# Patient Record
Sex: MALE | Race: WHITE | NOT HISPANIC OR LATINO | URBAN - METROPOLITAN AREA
[De-identification: names, ages, dates, MRNs, and addresses within clinical notes are randomized per-mention and may not be internally consistent; named-entity substitution may affect disease eponyms.]

---

## 2017-01-24 ENCOUNTER — EMERGENCY (EMERGENCY)
Facility: HOSPITAL | Age: 27
LOS: 1 days | Discharge: HOME | End: 2017-01-24
Attending: EMERGENCY MEDICINE | Admitting: EMERGENCY MEDICINE
Payer: COMMERCIAL

## 2017-01-24 VITALS
OXYGEN SATURATION: 96 % | TEMPERATURE: 101 F | SYSTOLIC BLOOD PRESSURE: 128 MMHG | RESPIRATION RATE: 21 BRPM | DIASTOLIC BLOOD PRESSURE: 80 MMHG | HEART RATE: 105 BPM | WEIGHT: 164.91 LBS

## 2017-01-24 VITALS
TEMPERATURE: 100 F | RESPIRATION RATE: 18 BRPM | DIASTOLIC BLOOD PRESSURE: 58 MMHG | HEART RATE: 95 BPM | SYSTOLIC BLOOD PRESSURE: 122 MMHG | OXYGEN SATURATION: 96 %

## 2017-01-24 DIAGNOSIS — R05 COUGH: ICD-10-CM

## 2017-01-24 DIAGNOSIS — J09.X2 INFLUENZA DUE TO IDENTIFIED NOVEL INFLUENZA A VIRUS WITH OTHER RESPIRATORY MANIFESTATIONS: ICD-10-CM

## 2017-01-24 DIAGNOSIS — R19.7 DIARRHEA, UNSPECIFIED: ICD-10-CM

## 2017-01-24 DIAGNOSIS — R00.0 TACHYCARDIA, UNSPECIFIED: ICD-10-CM

## 2017-01-24 LAB
ALBUMIN SERPL ELPH-MCNC: 4.5 G/DL — SIGNIFICANT CHANGE UP (ref 3.4–5)
ALP SERPL-CCNC: 68 U/L — SIGNIFICANT CHANGE UP (ref 40–120)
ALT FLD-CCNC: 52 U/L — HIGH (ref 12–42)
ANION GAP SERPL CALC-SCNC: 10 MMOL/L — SIGNIFICANT CHANGE UP (ref 9–16)
APTT BLD: 34.4 SEC — SIGNIFICANT CHANGE UP (ref 27.5–36.5)
AST SERPL-CCNC: 30 U/L — SIGNIFICANT CHANGE UP (ref 15–37)
BASOPHILS NFR BLD AUTO: 0.4 % — SIGNIFICANT CHANGE UP (ref 0–2)
BILIRUB SERPL-MCNC: 0.5 MG/DL — SIGNIFICANT CHANGE UP (ref 0.2–1.2)
BUN SERPL-MCNC: 15 MG/DL — SIGNIFICANT CHANGE UP (ref 7–23)
CALCIUM SERPL-MCNC: 9.6 MG/DL — SIGNIFICANT CHANGE UP (ref 8.5–10.5)
CHLORIDE SERPL-SCNC: 102 MMOL/L — SIGNIFICANT CHANGE UP (ref 96–108)
CO2 SERPL-SCNC: 26 MMOL/L — SIGNIFICANT CHANGE UP (ref 22–31)
CREAT SERPL-MCNC: 1.03 MG/DL — SIGNIFICANT CHANGE UP (ref 0.5–1.3)
EOSINOPHIL NFR BLD AUTO: 0.6 % — SIGNIFICANT CHANGE UP (ref 0–6)
ETHANOL SERPL-MCNC: <3 MG/DL — SIGNIFICANT CHANGE UP
FLUAV SPEC QL CULT: POSITIVE
FLUBV AG SPEC QL IA: NEGATIVE — SIGNIFICANT CHANGE UP
GLUCOSE SERPL-MCNC: 112 MG/DL — HIGH (ref 70–99)
HCT VFR BLD CALC: 38.9 % — LOW (ref 39–50)
HGB BLD-MCNC: 13.6 G/DL — SIGNIFICANT CHANGE UP (ref 13–17)
IMM GRANULOCYTES NFR BLD AUTO: 0.4 % — SIGNIFICANT CHANGE UP (ref 0–1.5)
INR BLD: 1.15 — SIGNIFICANT CHANGE UP (ref 0.88–1.16)
LACTATE SERPL-SCNC: 0.9 MMOL/L — SIGNIFICANT CHANGE UP (ref 0.4–2)
LIDOCAIN IGE QN: 86 U/L — SIGNIFICANT CHANGE UP (ref 73–393)
LYMPHOCYTES # BLD AUTO: 12.4 % — LOW (ref 13–44)
MCHC RBC-ENTMCNC: 29.8 PG — SIGNIFICANT CHANGE UP (ref 27–34)
MCHC RBC-ENTMCNC: 35 G/DL — SIGNIFICANT CHANGE UP (ref 32–36)
MCV RBC AUTO: 85.3 FL — SIGNIFICANT CHANGE UP (ref 80–100)
MONOCYTES NFR BLD AUTO: 13.2 % — SIGNIFICANT CHANGE UP (ref 2–14)
NEUTROPHILS NFR BLD AUTO: 73 % — SIGNIFICANT CHANGE UP (ref 43–77)
PLATELET # BLD AUTO: 197 K/UL — SIGNIFICANT CHANGE UP (ref 150–400)
POTASSIUM SERPL-MCNC: 3.6 MMOL/L — SIGNIFICANT CHANGE UP (ref 3.5–5.3)
POTASSIUM SERPL-SCNC: 3.6 MMOL/L — SIGNIFICANT CHANGE UP (ref 3.5–5.3)
PROT SERPL-MCNC: 8 G/DL — SIGNIFICANT CHANGE UP (ref 6.4–8.2)
PROTHROM AB SERPL-ACNC: 12.7 SEC — SIGNIFICANT CHANGE UP (ref 10–13.1)
RBC # BLD: 4.56 M/UL — SIGNIFICANT CHANGE UP (ref 4.2–5.8)
RBC # FLD: 12.4 % — SIGNIFICANT CHANGE UP (ref 10.3–16.9)
S PYO AG SPEC QL IA: NEGATIVE — SIGNIFICANT CHANGE UP
SODIUM SERPL-SCNC: 138 MMOL/L — SIGNIFICANT CHANGE UP (ref 132–145)
TSH SERPL-MCNC: 0.72 UIU/ML — SIGNIFICANT CHANGE UP (ref 0.36–3.74)
WBC # BLD: 4.8 K/UL — SIGNIFICANT CHANGE UP (ref 3.8–10.5)
WBC # FLD AUTO: 4.8 K/UL — SIGNIFICANT CHANGE UP (ref 3.8–10.5)

## 2017-01-24 PROCEDURE — 71010: CPT | Mod: 26

## 2017-01-24 PROCEDURE — 74176 CT ABD & PELVIS W/O CONTRAST: CPT | Mod: 26

## 2017-01-24 PROCEDURE — 99285 EMERGENCY DEPT VISIT HI MDM: CPT

## 2017-01-24 RX ORDER — ONDANSETRON 8 MG/1
4 TABLET, FILM COATED ORAL ONCE
Qty: 0 | Refills: 0 | Status: COMPLETED | OUTPATIENT
Start: 2017-01-24 | End: 2017-01-24

## 2017-01-24 RX ORDER — SODIUM CHLORIDE 9 MG/ML
500 INJECTION INTRAMUSCULAR; INTRAVENOUS; SUBCUTANEOUS
Qty: 0 | Refills: 0 | Status: COMPLETED | OUTPATIENT
Start: 2017-01-24 | End: 2017-01-24

## 2017-01-24 RX ORDER — ACETAMINOPHEN 500 MG
975 TABLET ORAL ONCE
Qty: 0 | Refills: 0 | Status: COMPLETED | OUTPATIENT
Start: 2017-01-24 | End: 2017-01-24

## 2017-01-24 RX ORDER — IBUPROFEN 200 MG
800 TABLET ORAL ONCE
Qty: 0 | Refills: 0 | Status: COMPLETED | OUTPATIENT
Start: 2017-01-24 | End: 2017-01-24

## 2017-01-24 RX ADMIN — SODIUM CHLORIDE 2000 MILLILITER(S): 9 INJECTION INTRAMUSCULAR; INTRAVENOUS; SUBCUTANEOUS at 21:31

## 2017-01-24 RX ADMIN — Medication 800 MILLIGRAM(S): at 20:50

## 2017-01-24 RX ADMIN — SODIUM CHLORIDE 2000 MILLILITER(S): 9 INJECTION INTRAMUSCULAR; INTRAVENOUS; SUBCUTANEOUS at 21:13

## 2017-01-24 RX ADMIN — SODIUM CHLORIDE 2000 MILLILITER(S): 9 INJECTION INTRAMUSCULAR; INTRAVENOUS; SUBCUTANEOUS at 20:45

## 2017-01-24 RX ADMIN — Medication 10 MILLILITER(S): at 21:55

## 2017-01-24 RX ADMIN — Medication 75 MILLIGRAM(S): at 22:17

## 2017-01-24 RX ADMIN — ONDANSETRON 4 MILLIGRAM(S): 8 TABLET, FILM COATED ORAL at 20:56

## 2017-01-24 RX ADMIN — SODIUM CHLORIDE 2000 MILLILITER(S): 9 INJECTION INTRAMUSCULAR; INTRAVENOUS; SUBCUTANEOUS at 21:00

## 2017-01-24 RX ADMIN — SODIUM CHLORIDE 2000 MILLILITER(S): 9 INJECTION INTRAMUSCULAR; INTRAVENOUS; SUBCUTANEOUS at 21:55

## 2017-01-24 RX ADMIN — Medication 975 MILLIGRAM(S): at 20:50

## 2017-01-24 NOTE — ED ADULT TRIAGE NOTE - CHIEF COMPLAINT QUOTE
here for fever, abdominal pain, n/v/d- here for fever, abdominal pain, n/v/d- appears lethargic- here for fever, cough, abdominal pain, n/v/d- appears lethargic-

## 2017-01-24 NOTE — ED PROVIDER NOTE - NEUROLOGICAL, MLM
Alert and oriented, no focal deficits, no motor or sensory deficits. neck supple, no Kernig's/Brudzinski's.

## 2017-01-24 NOTE — ED PROVIDER NOTE - OBJECTIVE STATEMENT
27 yo male no pmhx to ED via walk in accompanied by mother and girlfriend c/o gradual onset feeling ill today around noon while at work. Pt states muscle aches, fever/chills and np cough followed by sudden onset n/v/d. Pt c/o weakness. Denies headache, stiff neck, rash. Denies international travel within past 30 days.

## 2017-01-24 NOTE — ED ADULT NURSE NOTE - OBJECTIVE STATEMENT
Pt. c/o abdominal pain, nausea, vomiting, fever and weakness since this morning. No significant PMH, no recent travel.

## 2017-01-30 LAB
CULTURE RESULTS: SIGNIFICANT CHANGE UP
CULTURE RESULTS: SIGNIFICANT CHANGE UP
SPECIMEN SOURCE: SIGNIFICANT CHANGE UP
SPECIMEN SOURCE: SIGNIFICANT CHANGE UP

## 2018-10-13 ENCOUNTER — EMERGENCY (EMERGENCY)
Facility: HOSPITAL | Age: 28
LOS: 1 days | Discharge: HOME | End: 2018-10-13
Attending: EMERGENCY MEDICINE | Admitting: EMERGENCY MEDICINE
Payer: COMMERCIAL

## 2018-10-13 VITALS
SYSTOLIC BLOOD PRESSURE: 139 MMHG | DIASTOLIC BLOOD PRESSURE: 75 MMHG | HEART RATE: 79 BPM | RESPIRATION RATE: 14 BRPM | OXYGEN SATURATION: 99 %

## 2018-10-13 VITALS
RESPIRATION RATE: 16 BRPM | DIASTOLIC BLOOD PRESSURE: 81 MMHG | OXYGEN SATURATION: 98 % | HEART RATE: 89 BPM | WEIGHT: 149.91 LBS | TEMPERATURE: 97 F | SYSTOLIC BLOOD PRESSURE: 145 MMHG

## 2018-10-13 LAB
ALBUMIN SERPL ELPH-MCNC: 4.9 G/DL — SIGNIFICANT CHANGE UP (ref 3.4–5)
ALP SERPL-CCNC: 64 U/L — SIGNIFICANT CHANGE UP (ref 40–120)
ALT FLD-CCNC: 27 U/L — SIGNIFICANT CHANGE UP (ref 12–42)
ANION GAP SERPL CALC-SCNC: 14 MMOL/L — SIGNIFICANT CHANGE UP (ref 9–16)
APTT BLD: 28 SEC — SIGNIFICANT CHANGE UP (ref 27.5–36.5)
AST SERPL-CCNC: 24 U/L — SIGNIFICANT CHANGE UP (ref 15–37)
BASOPHILS NFR BLD AUTO: 0.6 % — SIGNIFICANT CHANGE UP (ref 0–2)
BILIRUB SERPL-MCNC: 0.3 MG/DL — SIGNIFICANT CHANGE UP (ref 0.2–1.2)
BUN SERPL-MCNC: 19 MG/DL — SIGNIFICANT CHANGE UP (ref 7–23)
CALCIUM SERPL-MCNC: 8.8 MG/DL — SIGNIFICANT CHANGE UP (ref 8.5–10.5)
CHLORIDE SERPL-SCNC: 104 MMOL/L — SIGNIFICANT CHANGE UP (ref 96–108)
CO2 SERPL-SCNC: 22 MMOL/L — SIGNIFICANT CHANGE UP (ref 22–31)
CREAT SERPL-MCNC: 1.07 MG/DL — SIGNIFICANT CHANGE UP (ref 0.5–1.3)
EOSINOPHIL NFR BLD AUTO: 0.9 % — SIGNIFICANT CHANGE UP (ref 0–6)
ETHANOL SERPL-MCNC: 46 MG/DL — HIGH
GLUCOSE BLDC GLUCOMTR-MCNC: 105 MG/DL — HIGH (ref 70–99)
GLUCOSE SERPL-MCNC: 119 MG/DL — HIGH (ref 70–99)
HCT VFR BLD CALC: 40 % — SIGNIFICANT CHANGE UP (ref 39–50)
HGB BLD-MCNC: 13.8 G/DL — SIGNIFICANT CHANGE UP (ref 13–17)
IMM GRANULOCYTES NFR BLD AUTO: 0.3 % — SIGNIFICANT CHANGE UP (ref 0–1.5)
INR BLD: 1.07 — SIGNIFICANT CHANGE UP (ref 0.88–1.16)
LYMPHOCYTES # BLD AUTO: 40.2 % — SIGNIFICANT CHANGE UP (ref 13–44)
MAGNESIUM SERPL-MCNC: 1.8 MG/DL — SIGNIFICANT CHANGE UP (ref 1.6–2.6)
MCHC RBC-ENTMCNC: 30.1 PG — SIGNIFICANT CHANGE UP (ref 27–34)
MCHC RBC-ENTMCNC: 34.5 G/DL — SIGNIFICANT CHANGE UP (ref 32–36)
MCV RBC AUTO: 87.3 FL — SIGNIFICANT CHANGE UP (ref 80–100)
MONOCYTES NFR BLD AUTO: 6.3 % — SIGNIFICANT CHANGE UP (ref 2–14)
NEUTROPHILS NFR BLD AUTO: 51.7 % — SIGNIFICANT CHANGE UP (ref 43–77)
PLATELET # BLD AUTO: 262 K/UL — SIGNIFICANT CHANGE UP (ref 150–400)
POTASSIUM SERPL-MCNC: 2.9 MMOL/L — CRITICAL LOW (ref 3.5–5.3)
POTASSIUM SERPL-MCNC: 3.4 MMOL/L — LOW (ref 3.5–5.3)
POTASSIUM SERPL-SCNC: 2.9 MMOL/L — CRITICAL LOW (ref 3.5–5.3)
POTASSIUM SERPL-SCNC: 3.4 MMOL/L — LOW (ref 3.5–5.3)
PROT SERPL-MCNC: 8.1 G/DL — SIGNIFICANT CHANGE UP (ref 6.4–8.2)
PROTHROM AB SERPL-ACNC: 11.8 SEC — SIGNIFICANT CHANGE UP (ref 9.8–12.7)
RBC # BLD: 4.58 M/UL — SIGNIFICANT CHANGE UP (ref 4.2–5.8)
RBC # FLD: 12.2 % — SIGNIFICANT CHANGE UP (ref 10.3–14.5)
SODIUM SERPL-SCNC: 140 MMOL/L — SIGNIFICANT CHANGE UP (ref 132–145)
WBC # BLD: 12.1 K/UL — HIGH (ref 3.8–10.5)
WBC # FLD AUTO: 12.1 K/UL — HIGH (ref 3.8–10.5)

## 2018-10-13 PROCEDURE — 93010 ELECTROCARDIOGRAM REPORT: CPT

## 2018-10-13 PROCEDURE — 99284 EMERGENCY DEPT VISIT MOD MDM: CPT | Mod: 25

## 2018-10-13 PROCEDURE — 70450 CT HEAD/BRAIN W/O DYE: CPT | Mod: 26

## 2018-10-13 RX ORDER — MAGNESIUM SULFATE 500 MG/ML
1 VIAL (ML) INJECTION ONCE
Qty: 0 | Refills: 0 | Status: DISCONTINUED | OUTPATIENT
Start: 2018-10-13 | End: 2018-10-13

## 2018-10-13 RX ORDER — ONDANSETRON 8 MG/1
4 TABLET, FILM COATED ORAL ONCE
Qty: 0 | Refills: 0 | Status: COMPLETED | OUTPATIENT
Start: 2018-10-13 | End: 2018-10-13

## 2018-10-13 RX ORDER — SODIUM CHLORIDE 9 MG/ML
1000 INJECTION INTRAMUSCULAR; INTRAVENOUS; SUBCUTANEOUS ONCE
Qty: 0 | Refills: 0 | Status: COMPLETED | OUTPATIENT
Start: 2018-10-13 | End: 2018-10-13

## 2018-10-13 RX ORDER — POTASSIUM CHLORIDE 20 MEQ
40 PACKET (EA) ORAL ONCE
Qty: 0 | Refills: 0 | Status: COMPLETED | OUTPATIENT
Start: 2018-10-13 | End: 2018-10-13

## 2018-10-13 RX ADMIN — SODIUM CHLORIDE 1000 MILLILITER(S): 9 INJECTION INTRAMUSCULAR; INTRAVENOUS; SUBCUTANEOUS at 21:24

## 2018-10-13 RX ADMIN — Medication 40 MILLIEQUIVALENT(S): at 22:28

## 2018-10-13 RX ADMIN — ONDANSETRON 4 MILLIGRAM(S): 8 TABLET, FILM COATED ORAL at 21:24

## 2018-10-13 NOTE — ED ADULT TRIAGE NOTE - CHIEF COMPLAINT QUOTE
Pt brought in by EMS for syncopal episode from a standing position while waiting in line at [employer]. No reported injuries at this time.  Pt states he feels "not good" and has nausea. Vomitted once while in the ambulance. Admits to smoking marijuana today.

## 2018-10-13 NOTE — ED ADULT NURSE NOTE - CHPI ED NUR SYMPTOMS NEG
no congestion/no shortness of breath/no chest pain/no vomiting/no chills/no back pain/no diaphoresis/no dizziness/no fever

## 2018-10-13 NOTE — ED ADULT NURSE NOTE - NSIMPLEMENTINTERV_GEN_ALL_ED
Implemented All Fall Risk Interventions:  [place] to call system. Call bell, personal items and telephone within reach. Instruct patient to call for assistance. Room bathroom lighting operational. Non-slip footwear when patient is off stretcher. Physically safe environment: no spills, clutter or unnecessary equipment. Stretcher in lowest position, wheels locked, appropriate side rails in place. Provide visual cue, wrist band, yellow gown, etc. Monitor gait and stability. Monitor for mental status changes and reorient to person, place, and time. Review medications for side effects contributing to fall risk. Reinforce activity limits and safety measures with patient and family.

## 2018-10-13 NOTE — ED ADULT NURSE NOTE - CHIEF COMPLAINT QUOTE
Pt brought in by EMS for syncopal episode from a standing position while waiting in line at [employer]. No reported injuries at this time.  Pt states he feels "not good" and has nausea. Vomitted once while in the ambulance. Admits to smoking marijuana today.

## 2018-10-13 NOTE — ED PROVIDER NOTE - MEDICAL DECISION MAKING DETAILS
Strict prompt return precautions to the Emergency Room discussed for any worsening or new symptoms. The patient verbalized understanding of these precautions and need to return. The patient tolerated PO fluids.  The patient's symptoms progressively improved throughout the ED stay. At the time of discharge from the Emergency Department, the patient is alert with fluent appropriate speech and ambulatory without difficulty.  A medical screening examination was performed and no emergency medical condition was identified.

## 2018-10-13 NOTE — ED PROVIDER NOTE - OBJECTIVE STATEMENT
27 yo male w episode of syncope while standing tonight s/p alcohol and marijuana use. pt states previous history of similar syncope episode in past. no prior history of seizure. no seizure activity w episode tonight. possible head trauma withy fall from standing height w syncope this episode. pt states he was feeling well on line at restaurant when he had a feeling of general warmth light-headedness "feeling like I was going to pass out" and then awoke on ground.   girlfriend present on scene and in ED, state episode laste x  30 seconds and when pt awake he was aware of who he was and where he was.     The patient denies the following symptoms:  headache, dizziness/lightheadedness, neck pain/neck stiffness, focal weakness, rash, fever, chills, chest/neck/jaw/arm or back pain, palpitations, shortness of breath, diaphoresis, swelling to arm and/or legs, N/V/D, abdominal pain,

## 2018-10-17 DIAGNOSIS — R55 SYNCOPE AND COLLAPSE: ICD-10-CM

## 2018-10-17 DIAGNOSIS — Z79.899 OTHER LONG TERM (CURRENT) DRUG THERAPY: ICD-10-CM

## 2020-11-05 NOTE — ED ADULT NURSE NOTE - MODE OF DISCHARGE
Ambulatory Opioid Withdrawal    WHAT YOU NEED TO KNOW:    Withdrawal is a response to a sudden lack of opioids in your body. Withdrawal happens when you suddenly decrease or stop taking an opioid you are dependent on. Dependence means you feel you need the opioid to function mentally or physically. This happens after you have used the opioid regularly for a long time. Withdrawal can happen with an illegal opioid such as heroin, or a prescription opioid such as oxycodone or fentanyl.    DISCHARGE INSTRUCTIONS:    Call your local emergency number (911 in the ), or have someone else call if:   •You have a seizure.      •You cannot be woken.      Call your doctor if:   •You have a fast heartbeat.      •You have nausea and are vomiting, or you cannot stop vomiting.      •You have the following signs and symptoms of dehydration: ?Dry eyes or mouth      ?Increased thirst      ?Dark yellow urine, or urinating little or not at all      ?Headache, dizziness, or confusion      ?Irregular or fast breathing, fast or pounding heartbeat      •You have questions or concerns about your condition or care.      Medicines: You may need any of the following:   •NSAIDs, such as ibuprofen, help decrease swelling, pain, and fever. This medicine is available with or without a doctor's order. NSAIDs can cause stomach bleeding or kidney problems in certain people. If you take blood thinner medicine, always ask your healthcare provider if NSAIDs are safe for you. Always read the medicine label and follow directions.      •Blood pressure medicine decreases symptoms of withdrawal, such as nausea, vomiting, muscle tension, and anxiety.       •Antianxiety medicine decreases anxiety and helps you feel calm and relaxed.      •Antinausea medicine helps calm your stomach and prevent vomiting.       •Take your medicine as directed. Contact your healthcare provider if you think your medicine is not helping or if you have side effects. Tell him of her if you are allergic to any medicine. Keep a list of the medicines, vitamins, and herbs you take. Include the amounts, and when and why you take them. Bring the list or the pill bottles to follow-up visits. Carry your medicine list with you in case of an emergency.      Prevent withdrawal from a prescription opioid: The best way to prevent withdrawal is to prevent tolerance. You may need to take a different kind of pain medicine after a surgery or injury. You can also talk to your healthcare provider about ways to manage pain without medicine. If you do need to take an opioid medicine, the following can help prevent withdrawal:   •Do not suddenly stop using the opioid. If you have been using the opioid for longer than 2 weeks, a sudden stop may cause dangerous side effects. Work with your healthcare provider to decrease your dose slowly.      •Take a prescribed opioid exactly as directed. Do not take more than the recommended amount. Do not take it more often or for longer than recommended. If you use a pain patch, be sure to remove the old patch before you place a new one. Talk to your doctor or a pharmacist if you have any questions about your medicine. Opioids often come with a Medication Guide to help you use it safely. Ask your pharmacists for a copy if you do not get one when you fill the prescription.      •Talk to your provider about signs or symptoms of a problem. Tell your provider if you think you are developing opioid tolerance or dependence. Work with your provider to stop or lower the amount safely.      Opioid safety:   •Do not take opioids that belong to someone else. The kind or amount that person takes may not be right for you.      •Do not mix opioids with other medicines or alcohol. The combination can cause an overdose, or cause you to stop breathing. Alcohol, sleeping pills, and medicines such as antihistamines can make you sleepy. A combination with opioids can lead to a coma.      •Learn about the signs of an overdose so you know how to respond. Tell others about these signs so they will know what to do if needed. Talk to your healthcare provider about naloxone. You may be able to keep naloxone at home in case of an overdose. Your family and friends can also be trained on how to give it to you if needed.      •Keep opioids out of the reach of children. Store opioids in a locked cabinet or in a location that children cannot get to.      •Follow instructions for what to do with prescription opioids you do not use. Your healthcare provider will give you instructions for how to dispose of it safely. This helps make sure no one else takes it.      Follow up with your healthcare provider as directed: You may need to return for other tests. You may also be referred to a specialty clinic to receive maintenance therapy medicine on a regular basis. Write down your questions so you remember to ask them during your visits.

## 2023-02-28 NOTE — ED PROVIDER NOTE - CARDIAC RHYTHM
- required transfusion on 2/22 and 2/27  - followup CT A/P to evaluate for bleed  - holding home Eliquis for now. Resume if no signs of bleeding  - followup stool occult  - CBC daily      regular

## 2023-08-01 ENCOUNTER — EMERGENCY (EMERGENCY)
Facility: HOSPITAL | Age: 33
LOS: 1 days | Discharge: HOME | End: 2023-08-01
Attending: EMERGENCY MEDICINE | Admitting: EMERGENCY MEDICINE
Payer: COMMERCIAL

## 2023-08-01 VITALS
OXYGEN SATURATION: 95 % | TEMPERATURE: 99 F | HEART RATE: 77 BPM | DIASTOLIC BLOOD PRESSURE: 71 MMHG | RESPIRATION RATE: 16 BRPM | SYSTOLIC BLOOD PRESSURE: 129 MMHG

## 2023-08-01 VITALS
RESPIRATION RATE: 16 BRPM | HEIGHT: 72 IN | OXYGEN SATURATION: 98 % | TEMPERATURE: 99 F | DIASTOLIC BLOOD PRESSURE: 72 MMHG | WEIGHT: 164.91 LBS | HEART RATE: 83 BPM | SYSTOLIC BLOOD PRESSURE: 115 MMHG

## 2023-08-01 LAB
ALBUMIN SERPL ELPH-MCNC: 4.1 G/DL — SIGNIFICANT CHANGE UP (ref 3.4–5)
ALP SERPL-CCNC: 129 U/L — HIGH (ref 40–120)
ALT FLD-CCNC: 203 U/L — HIGH (ref 12–42)
ANION GAP SERPL CALC-SCNC: 8 MMOL/L — LOW (ref 9–16)
AST SERPL-CCNC: 99 U/L — HIGH (ref 15–37)
BASOPHILS # BLD AUTO: 0.09 K/UL — SIGNIFICANT CHANGE UP (ref 0–0.2)
BASOPHILS NFR BLD AUTO: 1 % — SIGNIFICANT CHANGE UP (ref 0–2)
BILIRUB SERPL-MCNC: 0.5 MG/DL — SIGNIFICANT CHANGE UP (ref 0.2–1.2)
BUN SERPL-MCNC: 17 MG/DL — SIGNIFICANT CHANGE UP (ref 7–23)
CALCIUM SERPL-MCNC: 9.4 MG/DL — SIGNIFICANT CHANGE UP (ref 8.5–10.5)
CHLORIDE SERPL-SCNC: 101 MMOL/L — SIGNIFICANT CHANGE UP (ref 96–108)
CO2 SERPL-SCNC: 27 MMOL/L — SIGNIFICANT CHANGE UP (ref 22–31)
CREAT SERPL-MCNC: 0.99 MG/DL — SIGNIFICANT CHANGE UP (ref 0.5–1.3)
EGFR: 103 ML/MIN/1.73M2 — SIGNIFICANT CHANGE UP
EOSINOPHIL # BLD AUTO: 0 K/UL — SIGNIFICANT CHANGE UP (ref 0–0.5)
EOSINOPHIL NFR BLD AUTO: 0 % — SIGNIFICANT CHANGE UP (ref 0–6)
FLUAV AG NPH QL: SIGNIFICANT CHANGE UP
FLUBV AG NPH QL: SIGNIFICANT CHANGE UP
GLUCOSE SERPL-MCNC: 97 MG/DL — SIGNIFICANT CHANGE UP (ref 70–99)
HCT VFR BLD CALC: 35.3 % — LOW (ref 39–50)
HGB BLD-MCNC: 11.6 G/DL — LOW (ref 13–17)
LYMPHOCYTES # BLD AUTO: 4.83 K/UL — HIGH (ref 1–3.3)
LYMPHOCYTES # BLD AUTO: 53 % — HIGH (ref 13–44)
MCHC RBC-ENTMCNC: 29.8 PG — SIGNIFICANT CHANGE UP (ref 27–34)
MCHC RBC-ENTMCNC: 32.9 GM/DL — SIGNIFICANT CHANGE UP (ref 32–36)
MCV RBC AUTO: 90.7 FL — SIGNIFICANT CHANGE UP (ref 80–100)
MONOCYTES # BLD AUTO: 0.46 K/UL — SIGNIFICANT CHANGE UP (ref 0–0.9)
MONOCYTES NFR BLD AUTO: 5 % — SIGNIFICANT CHANGE UP (ref 2–14)
NEUTROPHILS # BLD AUTO: 2.82 K/UL — SIGNIFICANT CHANGE UP (ref 1.8–7.4)
NEUTROPHILS NFR BLD AUTO: 24 % — LOW (ref 43–77)
NRBC # BLD: SIGNIFICANT CHANGE UP /100 WBCS (ref 0–0)
PLATELET # BLD AUTO: 278 K/UL — SIGNIFICANT CHANGE UP (ref 150–400)
POTASSIUM SERPL-MCNC: 4.1 MMOL/L — SIGNIFICANT CHANGE UP (ref 3.5–5.3)
POTASSIUM SERPL-SCNC: 4.1 MMOL/L — SIGNIFICANT CHANGE UP (ref 3.5–5.3)
PROT SERPL-MCNC: 7.9 G/DL — SIGNIFICANT CHANGE UP (ref 6.4–8.2)
RBC # BLD: 3.89 M/UL — LOW (ref 4.2–5.8)
RBC # FLD: 13.1 % — SIGNIFICANT CHANGE UP (ref 10.3–14.5)
RSV RNA NPH QL NAA+NON-PROBE: SIGNIFICANT CHANGE UP
SARS-COV-2 RNA SPEC QL NAA+PROBE: SIGNIFICANT CHANGE UP
SODIUM SERPL-SCNC: 136 MMOL/L — SIGNIFICANT CHANGE UP (ref 132–145)
WBC # BLD: 9.11 K/UL — SIGNIFICANT CHANGE UP (ref 3.8–10.5)
WBC # FLD AUTO: 9.11 K/UL — SIGNIFICANT CHANGE UP (ref 3.8–10.5)

## 2023-08-01 PROCEDURE — 71046 X-RAY EXAM CHEST 2 VIEWS: CPT | Mod: 26

## 2023-08-01 PROCEDURE — 99285 EMERGENCY DEPT VISIT HI MDM: CPT

## 2023-08-01 PROCEDURE — 70491 CT SOFT TISSUE NECK W/DYE: CPT | Mod: 26

## 2023-08-01 RX ORDER — DEXAMETHASONE 0.5 MG/5ML
6 ELIXIR ORAL ONCE
Refills: 0 | Status: COMPLETED | OUTPATIENT
Start: 2023-08-01 | End: 2023-08-01

## 2023-08-01 RX ORDER — KETOROLAC TROMETHAMINE 30 MG/ML
15 SYRINGE (ML) INJECTION ONCE
Refills: 0 | Status: DISCONTINUED | OUTPATIENT
Start: 2023-08-01 | End: 2023-08-01

## 2023-08-01 RX ORDER — SODIUM CHLORIDE 9 MG/ML
1000 INJECTION INTRAMUSCULAR; INTRAVENOUS; SUBCUTANEOUS ONCE
Refills: 0 | Status: COMPLETED | OUTPATIENT
Start: 2023-08-01 | End: 2023-08-01

## 2023-08-01 RX ADMIN — SODIUM CHLORIDE 1000 MILLILITER(S): 9 INJECTION INTRAMUSCULAR; INTRAVENOUS; SUBCUTANEOUS at 18:37

## 2023-08-01 RX ADMIN — Medication 6 MILLIGRAM(S): at 18:35

## 2023-08-01 RX ADMIN — Medication 15 MILLIGRAM(S): at 18:36

## 2023-08-01 NOTE — ED PROVIDER NOTE - CLINICAL SUMMARY MEDICAL DECISION MAKING FREE TEXT BOX
33-year-old male with no significant past medical history presenting with 2 to 3 weeks of fever, sore throat, malaise, myalgias and generalized weakness. Differential diagnosis includes but is not limited to Mono, strep, flu, bacterial infection, pneumonia (less likely given absence of cough), viral infection. would get labs, ecg, cxr, CT neck, and tx symptomatically with fluids, steroids and toradol.

## 2023-08-01 NOTE — ED PROVIDER NOTE - PATIENT PORTAL LINK FT
You can access the FollowMyHealth Patient Portal offered by [facility] by registering at the following website: [email or web address]. By joining [facility]’s FollowMyHealth portal, you will also be able to view your health information using other applications (apps) compatible with our system.

## 2023-08-01 NOTE — ED PROVIDER NOTE - PHYSICAL EXAMINATION
General: unwell-appearing, no acute distress  Head: Atraumatic, normocephalic  Eyes: EOM grossly in tact, no scleral icterus, no discharge  ENT: moist mucous membranes, tonsilar swelling with exudates  + anterior cervical LAD  Neurology: A&Ox 3, nonfocal, ADAMES x 4  Respiratory: CTAB, no wheezing, normal respiratory effort  CV: RRR, good s1/s2, no S3, Extremities warm and well perfused  Abdominal: Soft, non-distended, non-tender, no masses  Extremities: No edema, no deformities  Skin: warm and dry. No rashes

## 2023-08-01 NOTE — ED ADULT TRIAGE NOTE - CHIEF COMPLAINT QUOTE
fever (103), difficulty swallowing and sore throat, bodyaches x weeks, no improvement s/p z-pack (BEFAST-)

## 2023-08-01 NOTE — ED PROVIDER NOTE - ATTENDING CONTRIBUTION TO CARE
33-year-old male no past medical history presents with 2 to 3 weeks of fever and viral symptoms.  Today primarily concerned about throat pain, worse on right side.  Took Z-Bolivar and does not feel better.    Afebrile  Uncomfortable but nontoxic  Bilateral pharyngeal erythema with tonsillar swelling (right greater than left), no exudates, no uvular swelling, no clear signs of PTA  Regular rate  No increased work of breathing  Alert    Assessment and plan: Viral syndrome with pharyngitis, with possible developing PTA  Plan for labs, meds, CT, reassess 33-year-old male no past medical history presents with 2 to 3 weeks of fever and viral symptoms.  Today primarily concerned about throat pain, worse on right side.  Took Z-Bolivar and does not feel better.    Afebrile  Uncomfortable but nontoxic  Bilateral pharyngeal erythema with tonsillar swelling (right greater than left), no exudates, no uvular swelling, no clear signs of PTA  Regular rate  No increased work of breathing  Alert    Assessment and plan: Viral syndrome with pharyngitis, with possible developing PTA  Plan for labs, meds, CT, reassess    830PM: CT without abscess  Labs with mild transaminitis, negative flu and COVID  Continue with supportive care and outpatient follow-up

## 2023-08-01 NOTE — ED ADULT NURSE REASSESSMENT NOTE - NS ED NURSE REASSESS COMMENT FT1
Pt received from EUNICE Bojorquez. Pt resting in stretcher. Reports improvement of throat pain after med administration. Pending scan results. AAOx4, respirations even and unlabored.

## 2023-08-01 NOTE — ED PROVIDER NOTE - PROGRESS NOTE DETAILS
Estrada, PGY-3, EM: pt with transaminitis. according to lab, mono screen takes > 24 hrs to result. discussed results of CT, cxr, and labs with the pt. advised it is most consistent with mono. Discussed avoiding contact sports and to return to ed or see his pcp if sx not resolving. discussed supportive care including throat lozenges, ibu ATC, and increasing fluid intake. return precautions discussed extensively.

## 2023-08-01 NOTE — ED PROVIDER NOTE - OBJECTIVE STATEMENT
33-year-old male with no significant past medical history presenting with 2 to 3 weeks of fever, sore throat, malaise, myalgias and generalized weakness.  Patient states that he has had a fever Tmax 103 he initially was treating it with over-the-counter cold medications without relief.  After having persistent fever for multiple days he went to urgent care who prescribed him a Z-Bolivar and told him to take OTC analgesics.  Patient at that time had a negative COVID test.  He is presenting with persistent throat pain, generalized weakness and continued malaise.  He denies nausea, vomiting, abdominal pain, chest pain, lower extremity pain or swelling. He denies sick contacts.

## 2023-08-01 NOTE — ED ADULT NURSE NOTE - NSFALLUNIVINTERV_ED_ALL_ED
Bed/Stretcher in lowest position, wheels locked, appropriate side rails in place/Call bell, personal items and telephone in reach/Instruct patient to call for assistance before getting out of bed/chair/stretcher/Non-slip footwear applied when patient is off stretcher/[place] to call system/Physically safe environment - no spills, clutter or unnecessary equipment/Purposeful proactive rounding/Room/bathroom lighting operational, light cord in reach

## 2023-08-02 LAB — HETEROPH AB TITR SER AGGL: POSITIVE

## 2023-08-04 DIAGNOSIS — Z20.822 CONTACT WITH AND (SUSPECTED) EXPOSURE TO COVID-19: ICD-10-CM

## 2023-08-04 DIAGNOSIS — F17.200 NICOTINE DEPENDENCE, UNSPECIFIED, UNCOMPLICATED: ICD-10-CM

## 2023-08-04 DIAGNOSIS — B34.9 VIRAL INFECTION, UNSPECIFIED: ICD-10-CM

## 2023-08-04 DIAGNOSIS — R50.9 FEVER, UNSPECIFIED: ICD-10-CM

## 2023-09-13 NOTE — ED POST DISCHARGE NOTE - OTHER COMMUNICATION
9/13 Patient called back due to certified letter. Feels markedly improved, though still has some lingering fatigue. f/u with PMD in 1 week. Return to the ED immediately if getting worse, not improving, or if having any new or troubling symptoms.

## 2024-10-04 NOTE — ED ADULT NURSE NOTE - PATIENT DISCHARGE SIGNATURE
The patient has been re-examined and I agree with the above assessment or I updated with my findings. 24-Jan-2017

## 2025-07-17 NOTE — ED PROVIDER NOTE - DISCHARGE DATE
Patient called to make sure his records were in chart for Jackie.    I did locate his Diabetic Eye Exam and his BS Log.    He would like his provider to review them.    Please advise.   24-Jan-2017